# Patient Record
Sex: FEMALE | Race: WHITE | NOT HISPANIC OR LATINO | Employment: OTHER | URBAN - METROPOLITAN AREA
[De-identification: names, ages, dates, MRNs, and addresses within clinical notes are randomized per-mention and may not be internally consistent; named-entity substitution may affect disease eponyms.]

---

## 2017-01-06 ENCOUNTER — ALLSCRIPTS OFFICE VISIT (OUTPATIENT)
Dept: OTHER | Facility: OTHER | Age: 57
End: 2017-01-06

## 2017-01-06 DIAGNOSIS — S20.02XA: ICD-10-CM

## 2017-01-06 DIAGNOSIS — Z12.11 ENCOUNTER FOR SCREENING FOR MALIGNANT NEOPLASM OF COLON: ICD-10-CM

## 2017-01-06 DIAGNOSIS — I82.403 ACUTE EMBOLISM AND THROMBOSIS OF DEEP VEIN OF BOTH LOWER EXTREMITIES (HCC): ICD-10-CM

## 2017-01-06 DIAGNOSIS — D69.3 IMMUNE THROMBOCYTOPENIC PURPURA (HCC): ICD-10-CM

## 2017-01-06 DIAGNOSIS — R10.9 ABDOMINAL PAIN: ICD-10-CM

## 2017-03-07 ENCOUNTER — GENERIC CONVERSION - ENCOUNTER (OUTPATIENT)
Dept: OTHER | Facility: OTHER | Age: 57
End: 2017-03-07

## 2017-10-20 ENCOUNTER — GENERIC CONVERSION - ENCOUNTER (OUTPATIENT)
Dept: OTHER | Facility: OTHER | Age: 57
End: 2017-10-20

## 2018-01-12 NOTE — MISCELLANEOUS
Message  Patient called and inquired about her 1/12/17 Abd x-ray results  Patient made aware that her vena cava filter was at the L2/L3 level  No abdominal masses or calcifications were noted  Also reviewed blood work results from 12/10/16 with the patient  Patient already on Synthroid for a TSH of 6 59  Patient made aware to follow up with her PCP in regards to her fasting glucose value of 128  Patient's WBC was 15 6, and ANC was 10 7- stable  Pt verbally understood results  Active Problems    1  Colon cancer screening (V76 51) (Z12 11)   2  Contusion of breast, left (922 0) (S20 02XA)   3  Deep vein thrombosis (DVT) of both lower extremities (453 40) (I82 403)   4  Deep vein thrombosis of left lower extremity (453 40) (I82 402)   5  Immune thrombocytopenia (287 49) (D69 3)   6  Left flank discomfort (789 09) (R10 9)   7  Lipoma (214 9) (D17 9)   8  Neutrophilia (288 8) (D72 9)   9  Sebaceous cyst (706 2) (L72 3)   10  Visit for screening mammogram (V76 12) (Z12 31)    Current Meds   1  Albuterol Sulfate (2 5 MG/3ML) 0 083% Inhalation Nebulization Solution; Therapy: (Recorded:06Jan2017) to Recorded   2  Fondaparinux Sodium 10 MG/0 8ML Subcutaneous Solution; INJECT 10 MG Daily; Therapy: 30TUA9010 to (Last Rx:61Zvj2205)  Requested for: 38XFN0592; Status:   ACTIVE - Retrospective By Protocol Authorization Ordered   3  Lasix 20 MG Oral Tablet (Furosemide); Therapy: (RXZXANZV:07QUA8275) to Recorded   4  Levothyroxine Sodium 50 MCG CAPS; Therapy: (YECUJLZN:03UMH1765) to Recorded   5  LORazepam 2 MG Oral Tablet; Therapy: (77 385 106) to Recorded   6  Percocet  MG Oral Tablet (Oxycodone-Acetaminophen); 1 tab 5 to 6 times daily; Therapy: (Recorded:10Mar2015) to Recorded   7  PROzac 40 MG Oral Capsule (FLUoxetine HCl); TAKE 1 CAPSULE DAILY; Therapy: (Recorded:10Mar2015) to Recorded    Allergies    1  Ambien CR TBCR   2   Morphine Derivatives   3  zolpidem    Signatures   Electronically signed by :  Jayda Grijalva RN; Mar  7 2017  8:31AM EST                       (Author)

## 2018-01-13 VITALS
TEMPERATURE: 97.1 F | HEIGHT: 66 IN | SYSTOLIC BLOOD PRESSURE: 130 MMHG | RESPIRATION RATE: 18 BRPM | WEIGHT: 260 LBS | DIASTOLIC BLOOD PRESSURE: 70 MMHG | HEART RATE: 84 BPM | BODY MASS INDEX: 41.78 KG/M2 | OXYGEN SATURATION: 94 %

## 2018-01-15 NOTE — MISCELLANEOUS
Message  called patient to inquire if she had any of her scans or labs done she didn't answer so I left a message for her to try to get it done over the weekend or if she needed to reschedule she can call the off at any time  Active Problems    1  Colon cancer screening (V76 51) (Z12 11)   2  Contusion of breast, left (922 0) (S20 02XA)   3  Deep vein thrombosis (DVT) of both lower extremities (453 40) (I82 403)   4  Deep vein thrombosis of left lower extremity (453 40) (I82 402)   5  Immune thrombocytopenia (287 49) (D69 3)   6  Left flank discomfort (789 09) (R10 9)   7  Lipoma (214 9) (D17 9)   8  Neutrophilia (288 8) (D72 9)   9  Sebaceous cyst (706 2) (L72 3)   10  Visit for screening mammogram (V76 12) (Z12 31)    Current Meds   1  Albuterol Sulfate (2 5 MG/3ML) 0 083% Inhalation Nebulization Solution; Therapy: (Recorded:06Jan2017) to Recorded   2  Fondaparinux Sodium 10 MG/0 8ML Subcutaneous Solution; INJECT 10 MG Daily; Therapy: 70LSE3501 to (Last Rx:22Mar2017)  Requested for: 22Mar2017; Status: ACTIVE   - Retrospective By Protocol Authorization Ordered   3  Lasix 20 MG Oral Tablet (Furosemide); Therapy: (GCDGVJXB:75YRJ9090) to Recorded   4  Levothyroxine Sodium 50 MCG CAPS; Therapy: (CPJUQRAD:56IRJ0663) to Recorded   5  LORazepam 2 MG Oral Tablet; Therapy: (77 385 106) to Recorded   6  Percocet  MG Oral Tablet (Oxycodone-Acetaminophen); 1 tab 5 to 6 times daily; Therapy: (Recorded:10Mar2015) to Recorded   7  PROzac 40 MG Oral Capsule (FLUoxetine HCl); TAKE 1 CAPSULE DAILY; Therapy: (Recorded:10Mar2015) to Recorded    Allergies    1  Ambien CR TBCR   2   Morphine Derivatives   3  zolpidem    Signatures   Electronically signed by : Jose R Bonilla, [address] [place]; Oct 20 2017  3:03PM EST                       (Author)

## 2018-01-15 NOTE — MISCELLANEOUS
Message  Pt c/o leg swelling and diarrhea  Pt concerned that that the lovenox shots were causing these symptoms  Explained to pt that lovenox does not typically cause leg swelling and diarrhea  Pt states that her lupus may be flaring up and will f/u with her PCP  Active Problems    1  Bilateral deep vein thromboses (453 40) (I82 403)   2  Colon cancer screening (V76 51) (Z12 11)   3  Deep vein thrombosis of left lower extremity (453 40) (I82 402)   4  Immune thrombocytopenia (287 49) (D69 59)   5  Lipoma (214 9) (D17 9)   6  Sebaceous cyst (706 2) (L72 3)   7  Visit for screening mammogram (V76 12) (Z12 31)    Current Meds   1  Fondaparinux Sodium 10 MG/0 8ML Subcutaneous Solution; INJECT 10 MG Daily; Therapy: 75XQT4226 to (Last Rx:74Eox3456)  Requested for: 89ZWV8689 Ordered   2  Labetalol HCl - 200 MG Oral Tablet; Take 1 tablet twice daily; Therapy: (Recorded:10Mar2015) to Recorded   3  Percocet  MG Oral Tablet (Oxycodone-Acetaminophen); 1 tab 5 to 6 times daily; Therapy: (Recorded:10Mar2015) to Recorded   4  PROzac 40 MG Oral Capsule (FLUoxetine HCl); TAKE 1 CAPSULE DAILY; Therapy: (Recorded:10Mar2015) to Recorded   5  Warfarin Sodium 5 MG Oral Tablet; Take 2 (10 mg) alternatiing with 1 1/2 (7 5 mg) daily,   adjust as directed; Therapy: 22KJW7668 to (Last Rx:18Apr2016)  Requested for: 18Apr2016; Status: ACTIVE   - Retrospective By Protocol Authorization Ordered    Allergies    1  Ambien CR TBCR    Signatures   Electronically signed by :  Rai Saxena RN; Aug  5 2016  1:54PM EST                       (Author)

## 2018-01-29 DIAGNOSIS — I26.99 OTHER PULMONARY EMBOLISM WITHOUT ACUTE COR PULMONALE, UNSPECIFIED CHRONICITY (HCC): Primary | ICD-10-CM

## 2018-01-30 ENCOUNTER — LAB (OUTPATIENT)
Dept: LAB | Facility: HOSPITAL | Age: 58
End: 2018-01-30
Attending: INTERNAL MEDICINE
Payer: MEDICARE

## 2018-01-30 ENCOUNTER — TELEPHONE (OUTPATIENT)
Dept: HEMATOLOGY ONCOLOGY | Facility: CLINIC | Age: 58
End: 2018-01-30

## 2018-01-30 DIAGNOSIS — I82.4Z9 DEEP VEIN THROMBOSIS (DVT) OF DISTAL VEIN OF LOWER EXTREMITY, UNSPECIFIED CHRONICITY, UNSPECIFIED LATERALITY (HCC): Primary | ICD-10-CM

## 2018-01-30 LAB
ALBUMIN SERPL BCP-MCNC: 3.5 G/DL (ref 3.5–5)
ALP SERPL-CCNC: 106 U/L (ref 46–116)
ALT SERPL W P-5'-P-CCNC: 21 U/L (ref 12–78)
ANION GAP SERPL CALCULATED.3IONS-SCNC: 9 MMOL/L (ref 4–13)
AST SERPL W P-5'-P-CCNC: 16 U/L (ref 5–45)
BASOPHILS # BLD AUTO: 0.07 THOUSANDS/ΜL (ref 0–0.1)
BASOPHILS NFR BLD AUTO: 1 % (ref 0–1)
BILIRUB SERPL-MCNC: 0.25 MG/DL (ref 0.2–1)
BUN SERPL-MCNC: 12 MG/DL (ref 5–25)
CALCIUM SERPL-MCNC: 8.7 MG/DL (ref 8.3–10.1)
CHLORIDE SERPL-SCNC: 106 MMOL/L (ref 100–108)
CO2 SERPL-SCNC: 28 MMOL/L (ref 21–32)
CREAT SERPL-MCNC: 0.84 MG/DL (ref 0.6–1.3)
EOSINOPHIL # BLD AUTO: 0.24 THOUSAND/ΜL (ref 0–0.61)
EOSINOPHIL NFR BLD AUTO: 2 % (ref 0–6)
ERYTHROCYTE [DISTWIDTH] IN BLOOD BY AUTOMATED COUNT: 13.6 % (ref 11.6–15.1)
GFR SERPL CREATININE-BSD FRML MDRD: 77 ML/MIN/1.73SQ M
GLUCOSE SERPL-MCNC: 143 MG/DL (ref 65–140)
HCT VFR BLD AUTO: 40 % (ref 34.8–46.1)
HGB BLD-MCNC: 13.3 G/DL (ref 11.5–15.4)
LYMPHOCYTES # BLD AUTO: 3.59 THOUSANDS/ΜL (ref 0.6–4.47)
LYMPHOCYTES NFR BLD AUTO: 31 % (ref 14–44)
MCH RBC QN AUTO: 32.7 PG (ref 26.8–34.3)
MCHC RBC AUTO-ENTMCNC: 33.3 G/DL (ref 31.4–37.4)
MCV RBC AUTO: 98 FL (ref 82–98)
MONOCYTES # BLD AUTO: 1.24 THOUSAND/ΜL (ref 0.17–1.22)
MONOCYTES NFR BLD AUTO: 11 % (ref 4–12)
NEUTROPHILS # BLD AUTO: 6.46 THOUSANDS/ΜL (ref 1.85–7.62)
NEUTS SEG NFR BLD AUTO: 55 % (ref 43–75)
NRBC BLD AUTO-RTO: 0 /100 WBCS
PLATELET # BLD AUTO: 417 THOUSANDS/UL (ref 149–390)
PMV BLD AUTO: 10.3 FL (ref 8.9–12.7)
POTASSIUM SERPL-SCNC: 3.2 MMOL/L (ref 3.5–5.3)
PROT SERPL-MCNC: 7.7 G/DL (ref 6.4–8.2)
RBC # BLD AUTO: 4.07 MILLION/UL (ref 3.81–5.12)
SODIUM SERPL-SCNC: 143 MMOL/L (ref 136–145)
WBC # BLD AUTO: 11.6 THOUSAND/UL (ref 4.31–10.16)

## 2018-01-30 PROCEDURE — 36415 COLL VENOUS BLD VENIPUNCTURE: CPT

## 2018-01-30 PROCEDURE — 80053 COMPREHEN METABOLIC PANEL: CPT

## 2018-01-30 PROCEDURE — 85025 COMPLETE CBC W/AUTO DIFF WBC: CPT

## 2018-01-31 ENCOUNTER — TELEPHONE (OUTPATIENT)
Dept: HEMATOLOGY ONCOLOGY | Facility: CLINIC | Age: 58
End: 2018-01-31

## 2018-01-31 RX ORDER — FONDAPARINUX SODIUM 10 MG/.8ML
10 INJECTION SUBCUTANEOUS EVERY 24 HOURS
Qty: 30 SYRINGE | Refills: 1 | Status: SHIPPED | OUTPATIENT
Start: 2018-01-31 | End: 2018-11-23 | Stop reason: SDUPTHER

## 2018-01-31 NOTE — TELEPHONE ENCOUNTER
Call back from [facility] at Dr Doreen [facility] office-Rec'd our fax for labs dated 1 30 18  Dr Apple Neal is placing pt on   K 10meq- two tabs for today, then one tab daily  He will order a repeat BMP on 2 5 18

## 2018-01-31 NOTE — TELEPHONE ENCOUNTER
Per call from Dr Cherie Valdez [phone]- [phone]-Pt had labs completed last evening  Labs reviewed by Dr Cherie Valdez  He did speak with the   Pt  By phone this morning    Arixtra was refilled per Dr Cherie Valdez   Pt  Will not need appt today as previously planned on 1 30 18 pm per Dr Cherie Valdez   Her next scheduled appt is 3 23 18

## 2018-01-31 NOTE — TELEPHONE ENCOUNTER
Call from Dr Mary Jane Charles forward labs dated 1 30 to Dr Rowena Montes  Pt is on a diuretic and has a low potassium level  Called Tarah @ Dr Juli Araya office [phone] the most recent lab work to the office  Fax [phone]   Pt's Arixtra refilled per Dr Tracy Rubio  Office appt w/Dr Tracy Rubio scheduled for 3 23 18

## 2018-03-01 ENCOUNTER — TELEPHONE (OUTPATIENT)
Dept: HEMATOLOGY ONCOLOGY | Facility: CLINIC | Age: 58
End: 2018-03-01

## 2018-03-01 NOTE — TELEPHONE ENCOUNTER
Called pt-made her aware that Dr Renetta Duckworth is not in the office to today for official review of the mammogram    Impression does indicate no malignancy noted  Pt aware and aware Dr Renetta Duckworth will look at the final report tomorrow

## 2018-03-01 NOTE — TELEPHONE ENCOUNTER
Patient called anxious for her mammogram results drawn at breast [facility] in  they told her they seen something on it?

## 2018-03-23 ENCOUNTER — OFFICE VISIT (OUTPATIENT)
Dept: HEMATOLOGY ONCOLOGY | Facility: CLINIC | Age: 58
End: 2018-03-23
Payer: MEDICARE

## 2018-03-23 VITALS
BODY MASS INDEX: 40.27 KG/M2 | WEIGHT: 250.6 LBS | DIASTOLIC BLOOD PRESSURE: 82 MMHG | OXYGEN SATURATION: 93 % | SYSTOLIC BLOOD PRESSURE: 160 MMHG | HEART RATE: 84 BPM | TEMPERATURE: 98 F | HEIGHT: 66 IN

## 2018-03-23 DIAGNOSIS — D69.3 IMMUNE THROMBOCYTOPENIC PURPURA (HCC): ICD-10-CM

## 2018-03-23 DIAGNOSIS — Z12.11 ENCOUNTER FOR SCREENING FOR MALIGNANT NEOPLASM OF COLON: ICD-10-CM

## 2018-03-23 DIAGNOSIS — I82.5Y3 CHRONIC DEEP VEIN THROMBOSIS (DVT) OF PROXIMAL VEIN OF BOTH LOWER EXTREMITIES (HCC): Primary | ICD-10-CM

## 2018-03-23 PROCEDURE — 99214 OFFICE O/P EST MOD 30 MIN: CPT | Performed by: INTERNAL MEDICINE

## 2018-03-23 RX ORDER — FLUOXETINE HYDROCHLORIDE 40 MG/1
1 CAPSULE ORAL DAILY
COMMUNITY

## 2018-03-23 RX ORDER — OXYCODONE AND ACETAMINOPHEN 10; 325 MG/1; MG/1
1 TABLET ORAL EVERY 4 HOURS
COMMUNITY
Start: 2018-04-02

## 2018-03-23 RX ORDER — FONDAPARINUX SODIUM 10 MG/.8ML
INJECTION SUBCUTANEOUS
COMMUNITY
Start: 2016-07-05 | End: 2019-06-07 | Stop reason: ALTCHOICE

## 2018-03-23 RX ORDER — FUROSEMIDE 20 MG/1
20 TABLET ORAL
COMMUNITY

## 2018-03-23 RX ORDER — POTASSIUM CHLORIDE 750 MG/1
10 TABLET, EXTENDED RELEASE ORAL
COMMUNITY
Start: 2018-01-31 | End: 2019-01-31

## 2018-03-23 RX ORDER — ALBUTEROL SULFATE 2.5 MG/3ML
SOLUTION RESPIRATORY (INHALATION)
COMMUNITY
Start: 2012-11-20

## 2018-03-23 RX ORDER — FLUOXETINE HYDROCHLORIDE 20 MG/1
CAPSULE ORAL
COMMUNITY
Start: 2017-10-18

## 2018-03-23 RX ORDER — LABETALOL 100 MG/1
100 TABLET, FILM COATED ORAL
COMMUNITY
Start: 2017-09-27

## 2018-03-23 RX ORDER — LORAZEPAM 2 MG/1
TABLET ORAL
COMMUNITY
Start: 2018-03-12

## 2018-03-23 RX ORDER — LEVOTHYROXINE SODIUM 0.05 MG/1
50 TABLET ORAL
COMMUNITY

## 2018-03-23 RX ORDER — LISINOPRIL 20 MG/1
20 TABLET ORAL
COMMUNITY

## 2018-03-23 NOTE — PROGRESS NOTES
March 23, 2018    Carlosyahirshyam Baldomero     She has been feeling well  She continues to experience left flank pain intermittently in the past 1-1/2 years, especially when lying on her left side  She has diffuse arthritis and fibromyalgia for which she has been taking approximately 4 doses of oxycodone  mg per day in this regard  She denies nose bleeds gingival bleeding or other bleeding on long-term anticoagulation  Hematology/Oncology History:    Previous Therapy:     1  Immune type thrombocytopenia, prednisone from February 2005 to June 2007  Danocrine from March 2005 to July 2005, Rituxan in November 2005, CellCept from January 2006 to July 2006, methotrexate from August 2006 to January 2007, splenectomy in April 2007  2  Left calf DVT, IVC filter in February 2005, right calf DVT in March 2005, and Coumadin initiated in March 2005  Current therapy:    Fondaparinux 10 mg SQ daily since July 2016  Physical Examination:    Blood pressure 160/82, pulse 84, temperature 98 °F (36 7 °C), temperature source Tympanic, height 5' 6" (1 676 m), weight 114 kg (250 lb 9 6 oz), SpO2 93 %  Body surface area is 2 2 meters squared  She appears well  EOMI  Oropharynx clear  No lymphadenopathy of the neck or axilla  (Breast examination was declined today )  Lungs are clear bilaterally  Heart has regular rate rhythm  Abdomen is obese  No inguinal lymphadenopathy  There is a 2 cm sebaceous cyst of the upper back  She has multiple seborrheic keratoses especially of the posterior thorax  There is chronic venous stasis dermatitis of the distal lower extremities  No bruising  No lower extremity edema  Neurological is grossly intact  Oriented x3, normal mood and affect  ECOG 2    Laboratory:    From October 20, 2017: Alk phos 94, bili 0 5  From January 30, 2018:  AST 16, ALT 21    From March 13, 2018:  Creatinine 0 87, 25 hydroxy vitamin-D level is 20      Targeted ultrasound the left breast from March 7, 2017:  There is interval improvement in the appearance of the suspected posttraumatic change  Bilateral screening mammogram from March 28, 2018: The breast are almost entirely fat, no mammographic evidence of malignancy  Assessment/Plan:    Alesia Kelley has tolerated anticoagulation with fondaparinux 10 mg subcutaneous daily since July 2016 with only mild bruising at the anterior abdominal wall sites of injection  Accordingly, fondaparinux 10 mg subcutaneous daily is to be continued on a long-term basis  CT scan of the abdomen and pelvis is recommended in regards to persistent intermittent left flank pain over the past year and a half, the patient declines CT scanning at this time  Immune type thrombocytopenia first diagnosed in February 2005 has remained in remission ever since splenectomy in April 2007  She was reminded that she is overdue for pelvic examination  The patient declines colonoscopy for colorectal cancer screening but is agreeable to fecal occult blood testing  The patient is to follow up with her rheumatologist in regards to the vitamin-D deficiency  The patient is aware seek medical attention for nose bleeds, easy bruising, it shortness of breath, progressive left flank pain, excessive fatigue, further new problems arise  Otherwise, I plan to see her again in 6 months  She was seen along with her  in the office today

## 2018-03-23 NOTE — PATIENT INSTRUCTIONS
The patient is aware seek medical attention for nose bleeds, easy bruising, it shortness of breath, progressive left flank pain, excessive fatigue, further new problems arise

## 2018-03-26 ENCOUNTER — TELEPHONE (OUTPATIENT)
Dept: HEMATOLOGY ONCOLOGY | Facility: HOSPITAL | Age: 58
End: 2018-03-26

## 2018-03-26 DIAGNOSIS — Z79.01 ANTICOAGULANT LONG-TERM USE: Primary | ICD-10-CM

## 2018-03-26 NOTE — TELEPHONE ENCOUNTER
Called pt-mailed lab request today for fecal occult blood testing per Dr Jose Carrasquillo note this morning  Pt  Laura Aggarwal understanding information

## 2018-06-04 ENCOUNTER — DOCUMENTATION (OUTPATIENT)
Dept: HEMATOLOGY ONCOLOGY | Facility: CLINIC | Age: 58
End: 2018-06-04

## 2018-06-04 NOTE — PROGRESS NOTES
5/30/2018 received notification from [facility] is needed for the Fondaparinux  Called [employer] @ 9:23 ([phone]) spoke with Annetta Leary who transferred me to an automated system to obtain an auth form  Received the form and sent it to Dr Nathan Walden for signature  However, he was out of the office until 6/1/2018 6/4/2018 received the signed form back and submitted for auth  Received auth  Per approval letter Gracie Ch is valid from 6/4/2018 through 6/4/2019      Notified clinical and [facility]

## 2018-11-15 ENCOUNTER — TELEPHONE (OUTPATIENT)
Dept: HEMATOLOGY ONCOLOGY | Facility: CLINIC | Age: 58
End: 2018-11-15

## 2018-11-15 NOTE — TELEPHONE ENCOUNTER
Called pt-per Dr Blaire Dove  Pt needs to schedule an appt in the office for a f/up OV  Dr Blaire Dove will ok Arixtra 10mg/inject one syringe (10mg)under the skin every 24 hours today/no refills  Rx called to [facility] [address] [place] [address]/[phone]  Pt  scheduled for f/up-11 30 18

## 2018-11-23 ENCOUNTER — OFFICE VISIT (OUTPATIENT)
Dept: HEMATOLOGY ONCOLOGY | Facility: CLINIC | Age: 58
End: 2018-11-23
Payer: MEDICARE

## 2018-11-23 VITALS
HEART RATE: 68 BPM | WEIGHT: 252.8 LBS | TEMPERATURE: 99.2 F | SYSTOLIC BLOOD PRESSURE: 188 MMHG | OXYGEN SATURATION: 94 % | BODY MASS INDEX: 42.12 KG/M2 | HEIGHT: 65 IN | RESPIRATION RATE: 18 BRPM | DIASTOLIC BLOOD PRESSURE: 84 MMHG

## 2018-11-23 DIAGNOSIS — I26.99 OTHER PULMONARY EMBOLISM WITHOUT ACUTE COR PULMONALE, UNSPECIFIED CHRONICITY (HCC): ICD-10-CM

## 2018-11-23 DIAGNOSIS — Z12.11 ENCOUNTER FOR COLORECTAL CANCER SCREENING: ICD-10-CM

## 2018-11-23 DIAGNOSIS — D69.3 IMMUNE THROMBOCYTOPENIC PURPURA (HCC): ICD-10-CM

## 2018-11-23 DIAGNOSIS — I82.5Y3 CHRONIC DEEP VEIN THROMBOSIS (DVT) OF PROXIMAL VEIN OF BOTH LOWER EXTREMITIES (HCC): Primary | ICD-10-CM

## 2018-11-23 DIAGNOSIS — Z12.12 ENCOUNTER FOR COLORECTAL CANCER SCREENING: ICD-10-CM

## 2018-11-23 PROCEDURE — 99214 OFFICE O/P EST MOD 30 MIN: CPT | Performed by: INTERNAL MEDICINE

## 2018-11-23 RX ORDER — FONDAPARINUX SODIUM 10 MG/.8ML
10 INJECTION SUBCUTANEOUS EVERY 24 HOURS
Qty: 30 SYRINGE | Refills: 5 | Status: SHIPPED | OUTPATIENT
Start: 2018-11-23 | End: 2019-06-07 | Stop reason: SDUPTHER

## 2018-11-24 PROBLEM — Z12.12 ENCOUNTER FOR COLORECTAL CANCER SCREENING: Status: ACTIVE | Noted: 2018-11-24

## 2018-11-24 PROBLEM — Z12.11 ENCOUNTER FOR COLORECTAL CANCER SCREENING: Status: ACTIVE | Noted: 2018-11-24

## 2018-11-26 ENCOUNTER — TELEPHONE (OUTPATIENT)
Dept: HEMATOLOGY ONCOLOGY | Facility: HOSPITAL | Age: 58
End: 2018-11-26

## 2018-11-26 NOTE — TELEPHONE ENCOUNTER
Called pt per Dr Eyad Cordova to let her know that she is due for fecal occult blood testing in January 2019 (last one was done 1/22/18)  Left message on answering machine to call back

## 2019-01-22 ENCOUNTER — TELEPHONE (OUTPATIENT)
Dept: HEMATOLOGY ONCOLOGY | Facility: CLINIC | Age: 59
End: 2019-01-22

## 2019-04-03 ENCOUNTER — TELEPHONE (OUTPATIENT)
Dept: HEMATOLOGY ONCOLOGY | Facility: CLINIC | Age: 59
End: 2019-04-03

## 2019-06-07 ENCOUNTER — OFFICE VISIT (OUTPATIENT)
Dept: HEMATOLOGY ONCOLOGY | Facility: CLINIC | Age: 59
End: 2019-06-07
Payer: MEDICARE

## 2019-06-07 VITALS
OXYGEN SATURATION: 95 % | HEIGHT: 65 IN | TEMPERATURE: 97.3 F | SYSTOLIC BLOOD PRESSURE: 180 MMHG | HEART RATE: 71 BPM | WEIGHT: 248 LBS | RESPIRATION RATE: 18 BRPM | BODY MASS INDEX: 41.32 KG/M2 | DIASTOLIC BLOOD PRESSURE: 100 MMHG

## 2019-06-07 DIAGNOSIS — I26.99 OTHER PULMONARY EMBOLISM WITHOUT ACUTE COR PULMONALE, UNSPECIFIED CHRONICITY (HCC): ICD-10-CM

## 2019-06-07 DIAGNOSIS — Z12.31 ENCOUNTER FOR SCREENING MAMMOGRAM FOR BREAST CANCER: ICD-10-CM

## 2019-06-07 DIAGNOSIS — Z12.11 ENCOUNTER FOR SCREENING FOR MALIGNANT NEOPLASM OF COLON: ICD-10-CM

## 2019-06-07 DIAGNOSIS — I82.5Y3 CHRONIC DEEP VEIN THROMBOSIS (DVT) OF PROXIMAL VEIN OF BOTH LOWER EXTREMITIES (HCC): Primary | ICD-10-CM

## 2019-06-07 DIAGNOSIS — D69.3 IMMUNE THROMBOCYTOPENIC PURPURA (HCC): ICD-10-CM

## 2019-06-07 PROCEDURE — 99214 OFFICE O/P EST MOD 30 MIN: CPT | Performed by: INTERNAL MEDICINE

## 2019-06-07 RX ORDER — FONDAPARINUX SODIUM 10 MG/.8ML
10 INJECTION SUBCUTANEOUS EVERY 24 HOURS
Qty: 30 SYRINGE | Refills: 5 | Status: SHIPPED | OUTPATIENT
Start: 2019-06-07

## 2019-06-13 ENCOUNTER — DOCUMENTATION (OUTPATIENT)
Dept: HEMATOLOGY ONCOLOGY | Facility: CLINIC | Age: 59
End: 2019-06-13

## 2020-01-23 NOTE — PROGRESS NOTES
11/23/2018    Jess Alfaro    She has been feeling well  She continues to experience left flank pain intermittently in the past 2 years, especially when lying on her left side  She has diffuse arthritis and fibromyalgia for which she has been taking approximately 4-6 doses of oxycodone  mg per day in this regard  She denies nose bleeds gingival bleeding or other bleeding on long-term anticoagulation  Hematology/Oncology History:    1  Immune type thrombocytopenia, prednisone from February 2005 to June 2007  Danocrine from March 2005 to July 2005, Rituxan in November 2005, CellCept from January 2006 to July 2006, methotrexate from August 2006 to January 2007, splenectomy in April 2007       2  Left calf DVT, IVC filter in February 2005, right calf DVT in March 2005, and Coumadin initiated in March 2005      Current therapy:     Fondaparinux 10 mg SQ daily since July 2016  Review of systems:      Constitutional: She has been feeling well, she denies chills or sweats  HEENT: She denies nose bleeds  She denies oral cavity or throat soreness  Cardiovascular:  She denies chest pain, no edema  Respiratory:  She has chronic dyspnea on exertion  She denies cough  GI:  Her appetite has been good  No abdominal pain  Bowel habits have been formed and regular  Musculoskeletal:  See HPI  Neurologic: She denies headache or paresthesias  Skin:  She denies rash  Psychiatric:  She denies emotional problems  Hematologic:  She denies easy bruising  Physical Examination:    Blood pressure (!) 188/84, pulse 68, temperature 99 2 °F (37 3 °C), resp  rate 18, height 5' 5 2" (1 656 m), weight 115 kg (252 lb 12 8 oz), SpO2 94 %  Body surface area is 2 19 meters squared  She appears well  EOMI  Oropharynx clear  No lymphadenopathy of the neck  No axillary lymphadenopathy  (Breast examination was declined today )    Lungs are clear bilaterally  Heart has regular rate rhythm     Abdomen is obese      There is mild bruising and scarring at lower anterior abdominal wall injection sites  No inguinal lymphadenopathy  She has multiple seborrheic keratoses especially of the posterior thorax  There is chronic venous stasis dermatitis of the distal lower extremities  No bruising  No lower extremity edema  Neurological is grossly intact  Oriented x3, normal mood and affect      ECOG 2    Laboratory:    From January 22, 2018:   Fecal immunochemical test is negative  From May 29, 2018:  Creatinine 0 96    Assessment/Plan:    Eric Zepeda has tolerated anticoagulation with fondaparinux 10 mg subcutaneous daily since July 2016 with only mild bruising at the anterior abdominal wall sites of injection  Accordingly, fondaparinux 10 mg subcutaneous daily is to be continued on a long-term basis      CT scan of the abdomen and pelvis is recommended in regards to persistent intermittent left flank pain over the past 2 years, however, the patient declines CT scanning at this time        Immune type thrombocytopenia first diagnosed in February 2005 has remained in remission ever since splenectomy in April 2007      She was reminded that she is overdue for pelvic examination  The patient declines colonoscopy for colorectal cancer screening but is agreeable to yearly fecal occult blood testing  The patient and her  who was with her in the office today are aware seek medical attention for nose bleeds, easy bruising, chest pain, progressive shortness of breath, progressive left flank pain, excessive fatigue, or if other new problems arise    Otherwise, plan to see her again in 6 months     gradual onset

## 2020-02-25 DIAGNOSIS — I26.99 OTHER PULMONARY EMBOLISM WITHOUT ACUTE COR PULMONALE, UNSPECIFIED CHRONICITY (HCC): ICD-10-CM

## 2020-03-13 ENCOUNTER — TELEPHONE (OUTPATIENT)
Dept: HEMATOLOGY ONCOLOGY | Facility: CLINIC | Age: 60
End: 2020-03-13

## 2020-03-13 RX ORDER — FONDAPARINUX SODIUM 10 MG/.8ML
INJECTION SUBCUTANEOUS
Qty: 24 ML | OUTPATIENT
Start: 2020-03-13

## 2020-04-24 ENCOUNTER — TELEPHONE (OUTPATIENT)
Dept: HEMATOLOGY ONCOLOGY | Facility: CLINIC | Age: 60
End: 2020-04-24